# Patient Record
Sex: MALE | Race: WHITE | NOT HISPANIC OR LATINO
[De-identification: names, ages, dates, MRNs, and addresses within clinical notes are randomized per-mention and may not be internally consistent; named-entity substitution may affect disease eponyms.]

---

## 2024-01-11 ENCOUNTER — DASHBOARD ENCOUNTERS (OUTPATIENT)
Age: 49
End: 2024-01-11

## 2024-01-11 ENCOUNTER — OFFICE VISIT (OUTPATIENT)
Dept: URBAN - METROPOLITAN AREA CLINIC 98 | Facility: CLINIC | Age: 49
End: 2024-01-11
Payer: COMMERCIAL

## 2024-01-11 VITALS
HEART RATE: 79 BPM | BODY MASS INDEX: 28.6 KG/M2 | HEIGHT: 70 IN | DIASTOLIC BLOOD PRESSURE: 71 MMHG | WEIGHT: 199.8 LBS | SYSTOLIC BLOOD PRESSURE: 113 MMHG | TEMPERATURE: 97.5 F

## 2024-01-11 DIAGNOSIS — Z12.11 SCREENING FOR COLON CANCER: ICD-10-CM

## 2024-01-11 DIAGNOSIS — R14.0 BLOATING: ICD-10-CM

## 2024-01-11 DIAGNOSIS — K21.00 CHRONIC REFLUX ESOPHAGITIS: ICD-10-CM

## 2024-01-11 DIAGNOSIS — K80.50 BILIARY COLIC: ICD-10-CM

## 2024-01-11 DIAGNOSIS — R10.9 ABDOMINAL DISCOMFORT: ICD-10-CM

## 2024-01-11 PROBLEM — 266433003: Status: ACTIVE | Noted: 2024-01-11

## 2024-01-11 PROCEDURE — 99244 OFF/OP CNSLTJ NEW/EST MOD 40: CPT | Performed by: INTERNAL MEDICINE

## 2024-01-11 NOTE — PHYSICAL EXAM NEUROLOGIC:
oriented to person, place and time , normal sensation , short and long term memory intact Patient requests all Lab, Cardiology, and Radiology Results on their Discharge Instructions

## 2024-01-11 NOTE — HPI-TODAY'S VISIT:
Patient referred by Dr. Art and note david be sent over.  Having issues with GERD- persistent cough- been on PPI prn Came from [place] and [place] some [place] at [place] Ate [place] food- felt nauseous- woke up at night- excessive diarrhea for 3 days.  no bleeding. Resumed eating with worsening heartburn and pressure in the upper abdomen and bloating. Went to [place] for  and had pepperoni pizza and ice cream- had more bloating and pressure with pain. Admitted at [place] with transaminitis and GB sludge. MRCP with pericholecytic fluid and no ductal dilation.  Symptoms resolved but had recurrance of some bloating on way back from [place] Right now only on liquids. Father with pancreatic cancer at 68 No prior colonoscopy

## 2024-01-12 LAB
ALBUMIN: 4.6
ALKALINE PHOSPHATASE: 181
ALT (SGPT): 399
AST (SGOT): 113
BASO (ABSOLUTE): 0
BASOS: 1
BILIRUBIN, DIRECT: 0.37
BILIRUBIN, TOTAL: 1
EOS (ABSOLUTE): 0.2
EOS: 3
HEMATOCRIT: 47.7
HEMATOLOGY COMMENTS:: (no result)
HEMOGLOBIN: 15.3
IMMATURE CELLS: (no result)
IMMATURE GRANS (ABS): 0
IMMATURE GRANULOCYTES: 1
LYMPHS (ABSOLUTE): 1.9
LYMPHS: 31
MCH: 29.8
MCHC: 32.1
MCV: 93
MONOCYTES(ABSOLUTE): 0.4
MONOCYTES: 6
NEUTROPHILS (ABSOLUTE): 3.6
NEUTROPHILS: 58
NRBC: (no result)
PLATELETS: 347
PROTEIN, TOTAL: 7.2
RBC: 5.13
RDW: 13.1
WBC: 6.1

## 2024-08-22 ENCOUNTER — WEB ENCOUNTER (OUTPATIENT)
Dept: URBAN - METROPOLITAN AREA CLINIC 98 | Facility: CLINIC | Age: 49
End: 2024-08-22

## 2024-08-30 ENCOUNTER — LAB OUTSIDE AN ENCOUNTER (OUTPATIENT)
Dept: URBAN - METROPOLITAN AREA CLINIC 98 | Facility: CLINIC | Age: 49
End: 2024-08-30

## 2024-08-30 ENCOUNTER — OFFICE VISIT (OUTPATIENT)
Dept: URBAN - METROPOLITAN AREA CLINIC 98 | Facility: CLINIC | Age: 49
End: 2024-08-30
Payer: COMMERCIAL

## 2024-08-30 VITALS
BODY MASS INDEX: 28.63 KG/M2 | DIASTOLIC BLOOD PRESSURE: 80 MMHG | HEIGHT: 70 IN | HEART RATE: 85 BPM | SYSTOLIC BLOOD PRESSURE: 118 MMHG | TEMPERATURE: 97.1 F | WEIGHT: 200 LBS

## 2024-08-30 DIAGNOSIS — Z12.11 SCREENING FOR COLON CANCER: ICD-10-CM

## 2024-08-30 DIAGNOSIS — R10.9 ABDOMINAL DISCOMFORT: ICD-10-CM

## 2024-08-30 DIAGNOSIS — K80.50 BILIARY COLIC: ICD-10-CM

## 2024-08-30 DIAGNOSIS — K21.00 CHRONIC REFLUX ESOPHAGITIS: ICD-10-CM

## 2024-08-30 DIAGNOSIS — R14.0 BLOATING: ICD-10-CM

## 2024-08-30 PROCEDURE — 99214 OFFICE O/P EST MOD 30 MIN: CPT | Performed by: INTERNAL MEDICINE

## 2024-08-30 NOTE — HPI-TODAY'S VISIT:
Patient referred by Dr. Art and note david be sent over.  Having issues with GERD- persistent cough- been on PPI prn Came from [place] and was [place] some cough at [place] Ate [place] food- felt nauseous- woke up at night- excessive diarrhea for 3 days.  no bleeding. Resumed eating with worsening heartburn and pressure in the upper abdomen and bloating. Went to [place] for  and had pepperoni pizza and ice cream- had more bloating and pressure with pain. Admitted at [facility] with transaminitis and GB sludge. MRCP with pericholecytic fluid and no ductal dilation.  Symptoms resolved but had recurrance of some bloating on way back from [place] Right now only on liquids. Father with pancreatic cancer at 68 No prior colonoscopy  Present - had CCY in Feb - No bleeding - some GERD issues as well.  - on meds prn

## 2024-09-05 ENCOUNTER — OFFICE VISIT (OUTPATIENT)
Dept: URBAN - METROPOLITAN AREA CLINIC 98 | Facility: CLINIC | Age: 49
End: 2024-09-05

## 2024-09-13 ENCOUNTER — LAB OUTSIDE AN ENCOUNTER (OUTPATIENT)
Dept: URBAN - METROPOLITAN AREA CLINIC 98 | Facility: CLINIC | Age: 49
End: 2024-09-13

## 2024-09-19 ENCOUNTER — TELEPHONE ENCOUNTER (OUTPATIENT)
Dept: URBAN - METROPOLITAN AREA CLINIC 98 | Facility: CLINIC | Age: 49
End: 2024-09-19

## 2024-10-08 ENCOUNTER — CLAIMS CREATED FROM THE CLAIM WINDOW (OUTPATIENT)
Dept: URBAN - METROPOLITAN AREA CLINIC 4 | Facility: CLINIC | Age: 49
End: 2024-10-08
Payer: COMMERCIAL

## 2024-10-08 ENCOUNTER — CLAIMS CREATED FROM THE CLAIM WINDOW (OUTPATIENT)
Dept: URBAN - METROPOLITAN AREA SURGERY CENTER 18 | Facility: SURGERY CENTER | Age: 49
End: 2024-10-08
Payer: COMMERCIAL

## 2024-10-08 DIAGNOSIS — K31.89 MUCOSAL ABNORMALITY OF STOMACH: ICD-10-CM

## 2024-10-08 DIAGNOSIS — K21.9 ACID REFLUX: ICD-10-CM

## 2024-10-08 DIAGNOSIS — D12.3 ADENOMA OF TRANSVERSE COLON: ICD-10-CM

## 2024-10-08 DIAGNOSIS — K21.9 GERD: ICD-10-CM

## 2024-10-08 DIAGNOSIS — Z12.11 COLON CANCER SCREENING: ICD-10-CM

## 2024-10-08 DIAGNOSIS — K29.70 GASTRITIS, UNSPECIFIED, WITHOUT BLEEDING: ICD-10-CM

## 2024-10-08 DIAGNOSIS — K21.9 GASTRO-ESOPHAGEAL REFLUX DISEASE WITHOUT ESOPHAGITIS: ICD-10-CM

## 2024-10-08 DIAGNOSIS — Z12.11 COLON CANCER SCREENING (HIGH RISK): ICD-10-CM

## 2024-10-08 DIAGNOSIS — D12.3 BENIGN NEOPLASM OF TRANSVERSE COLON: ICD-10-CM

## 2024-10-08 DIAGNOSIS — K44.9 HIATAL HERNIA: ICD-10-CM

## 2024-10-08 DIAGNOSIS — D12.3 BENIGN NEOPLASM OF HEPATIC FLEXURE OF COLON: ICD-10-CM

## 2024-10-08 DIAGNOSIS — K31.89 OTHER DISEASES OF STOMACH AND DUODENUM: ICD-10-CM

## 2024-10-08 PROCEDURE — 88305 TISSUE EXAM BY PATHOLOGIST: CPT | Performed by: PATHOLOGY

## 2024-10-08 PROCEDURE — 88312 SPECIAL STAINS GROUP 1: CPT | Performed by: PATHOLOGY

## 2024-10-08 PROCEDURE — 43239 EGD BIOPSY SINGLE/MULTIPLE: CPT | Performed by: INTERNAL MEDICINE

## 2024-10-08 PROCEDURE — 00813 ANES UPR LWR GI NDSC PX: CPT | Performed by: ANESTHESIOLOGY

## 2024-10-08 PROCEDURE — 45385 COLONOSCOPY W/LESION REMOVAL: CPT | Performed by: INTERNAL MEDICINE
